# Patient Record
Sex: MALE | URBAN - METROPOLITAN AREA
[De-identification: names, ages, dates, MRNs, and addresses within clinical notes are randomized per-mention and may not be internally consistent; named-entity substitution may affect disease eponyms.]

---

## 2024-02-05 ENCOUNTER — LAB (OUTPATIENT)
Dept: URBAN - METROPOLITAN AREA CLINIC 48 | Facility: CLINIC | Age: 79
End: 2024-02-05

## 2024-02-05 ENCOUNTER — OV NP (OUTPATIENT)
Dept: URBAN - METROPOLITAN AREA CLINIC 48 | Facility: CLINIC | Age: 79
End: 2024-02-05
Payer: MEDICARE

## 2024-02-05 VITALS
OXYGEN SATURATION: 97 % | BODY MASS INDEX: 29.29 KG/M2 | SYSTOLIC BLOOD PRESSURE: 120 MMHG | WEIGHT: 209.2 LBS | DIASTOLIC BLOOD PRESSURE: 81 MMHG | TEMPERATURE: 98.1 F | HEART RATE: 80 BPM | HEIGHT: 71 IN

## 2024-02-05 DIAGNOSIS — R19.4 CHANGE IN BOWEL HABITS: ICD-10-CM

## 2024-02-05 DIAGNOSIS — R19.8 BORBORYGMI: ICD-10-CM

## 2024-02-05 DIAGNOSIS — Z12.11 COLON CANCER SCREENING: ICD-10-CM

## 2024-02-05 PROBLEM — 413681009: Status: ACTIVE | Noted: 2024-02-05

## 2024-02-05 PROBLEM — 305058001: Status: ACTIVE | Noted: 2024-02-05

## 2024-02-05 PROBLEM — 88111009: Status: ACTIVE | Noted: 2024-02-05

## 2024-02-05 PROCEDURE — 99203 OFFICE O/P NEW LOW 30 MIN: CPT | Performed by: INTERNAL MEDICINE

## 2024-02-05 RX ORDER — TRIAMCINOLONE ACETONIDE 1 MG/G
1 APPLICATION CREAM TOPICAL
Status: ACTIVE | COMMUNITY

## 2024-02-05 RX ORDER — ATORVASTATIN CALCIUM 20 MG/1
1 TABLET TABLET, FILM COATED ORAL ONCE A DAY
Status: ACTIVE | COMMUNITY

## 2024-02-05 RX ORDER — METFORMIN HYDROCHLORIDE 500 MG/1
1 TABLET WITH A MEAL TABLET, FILM COATED ORAL ONCE A DAY
Status: ACTIVE | COMMUNITY

## 2024-02-05 NOTE — HPI-TODAY'S VISIT:
78 year old male presents for a colon consult. Bowel movements alternate between formed stool and loose stool. The patient's symptoms are suggestive of IBS-D. He used to take Belladonna. The patient denies blood in stool, weight changes, or abdominal pain. Last colonoscopy was 10-11 years ago in [place] and reports it was normal (we do not have report). At that time, he had a hemorrhoid and anal fissure treatment. 11/2023 CMP was unremarkable but a CBC was not done. Denies GI disease in the family.

## 2024-05-23 ENCOUNTER — OFFICE VISIT (OUTPATIENT)
Dept: URBAN - NONMETROPOLITAN AREA MEDICAL CENTER 6 | Facility: MEDICAL CENTER | Age: 79
End: 2024-05-23